# Patient Record
Sex: MALE | Race: WHITE | NOT HISPANIC OR LATINO | Employment: OTHER | URBAN - METROPOLITAN AREA
[De-identification: names, ages, dates, MRNs, and addresses within clinical notes are randomized per-mention and may not be internally consistent; named-entity substitution may affect disease eponyms.]

---

## 2017-01-26 RX ORDER — LISINOPRIL 2.5 MG/1
TABLET ORAL
Qty: 90 TABLET | Refills: 1 | Status: SHIPPED | OUTPATIENT
Start: 2017-01-26 | End: 2017-07-27 | Stop reason: SDUPTHER

## 2017-03-23 ENCOUNTER — TELEPHONE (OUTPATIENT)
Dept: INTERNAL MEDICINE | Age: 72
End: 2017-03-23

## 2017-03-23 ENCOUNTER — OFFICE VISIT (OUTPATIENT)
Dept: INTERNAL MEDICINE | Age: 72
End: 2017-03-23

## 2017-03-23 VITALS
HEIGHT: 70 IN | BODY MASS INDEX: 30.85 KG/M2 | WEIGHT: 215.5 LBS | DIASTOLIC BLOOD PRESSURE: 62 MMHG | SYSTOLIC BLOOD PRESSURE: 140 MMHG | OXYGEN SATURATION: 100 % | HEART RATE: 72 BPM | TEMPERATURE: 97.6 F

## 2017-03-23 DIAGNOSIS — Z00.00 MEDICARE ANNUAL WELLNESS VISIT, SUBSEQUENT: Primary | ICD-10-CM

## 2017-03-23 DIAGNOSIS — K21.9 GASTROESOPHAGEAL REFLUX DISEASE WITHOUT ESOPHAGITIS: ICD-10-CM

## 2017-03-23 DIAGNOSIS — I10 ESSENTIAL HYPERTENSION: ICD-10-CM

## 2017-03-23 DIAGNOSIS — E78.49 OTHER HYPERLIPIDEMIA: ICD-10-CM

## 2017-03-23 DIAGNOSIS — G25.81 RESTLESS LEGS SYNDROME: ICD-10-CM

## 2017-03-23 LAB
ALBUMIN SERPL-MCNC: 4.7 G/DL (ref 3.5–5.2)
ALBUMIN/GLOB SERPL: 2 G/DL
ALP SERPL-CCNC: 79 U/L (ref 39–117)
ALT SERPL-CCNC: 38 U/L (ref 1–41)
APPEARANCE UR: CLEAR
AST SERPL-CCNC: 26 U/L (ref 1–40)
BACTERIA #/AREA URNS HPF: ABNORMAL /HPF
BASOPHILS # BLD AUTO: 0.04 10*3/MM3 (ref 0–0.2)
BASOPHILS NFR BLD AUTO: 0.8 % (ref 0–1.5)
BILIRUB SERPL-MCNC: 2.1 MG/DL (ref 0.1–1.2)
BILIRUB UR QL STRIP: NEGATIVE
BUN SERPL-MCNC: 15 MG/DL (ref 8–23)
BUN/CREAT SERPL: 17.6 (ref 7–25)
CALCIUM SERPL-MCNC: 9.8 MG/DL (ref 8.6–10.5)
CHLORIDE SERPL-SCNC: 99 MMOL/L (ref 98–107)
CHOLEST SERPL-MCNC: 171 MG/DL (ref 0–200)
CO2 SERPL-SCNC: 24.8 MMOL/L (ref 22–29)
COLOR UR: ABNORMAL
CREAT SERPL-MCNC: 0.85 MG/DL (ref 0.76–1.27)
DEVELOPER EXPIRATION DATE: NORMAL
DEVELOPER LOT NUMBER: NORMAL
EOSINOPHIL # BLD AUTO: 0.09 10*3/MM3 (ref 0–0.7)
EOSINOPHIL NFR BLD AUTO: 1.8 % (ref 0.3–6.2)
EPI CELLS #/AREA URNS HPF: ABNORMAL /HPF
ERYTHROCYTE [DISTWIDTH] IN BLOOD BY AUTOMATED COUNT: 15.3 % (ref 11.5–14.5)
EXPIRATION DATE: NORMAL
FECAL OCCULT BLOOD SCREEN, POC: NEGATIVE
GLOBULIN SER CALC-MCNC: 2.4 GM/DL
GLUCOSE SERPL-MCNC: 126 MG/DL (ref 65–99)
GLUCOSE UR QL: NEGATIVE
HCT VFR BLD AUTO: 37 % (ref 40.4–52.2)
HDLC SERPL-MCNC: 68 MG/DL (ref 40–60)
HGB BLD-MCNC: 11.9 G/DL (ref 13.7–17.6)
HGB UR QL STRIP: NEGATIVE
IMM GRANULOCYTES # BLD: 0.02 10*3/MM3 (ref 0–0.03)
IMM GRANULOCYTES NFR BLD: 0.4 % (ref 0–0.5)
KETONES UR QL STRIP: NEGATIVE
LDLC SERPL CALC-MCNC: 82 MG/DL (ref 0–100)
LEUKOCYTE ESTERASE UR QL STRIP: ABNORMAL
LYMPHOCYTES # BLD AUTO: 0.97 10*3/MM3 (ref 0.9–4.8)
LYMPHOCYTES NFR BLD AUTO: 19.9 % (ref 19.6–45.3)
Lab: NORMAL
MCH RBC QN AUTO: 30.8 PG (ref 27–32.7)
MCHC RBC AUTO-ENTMCNC: 32.2 G/DL (ref 32.6–36.4)
MCV RBC AUTO: 95.9 FL (ref 79.8–96.2)
MONOCYTES # BLD AUTO: 0.53 10*3/MM3 (ref 0.2–1.2)
MONOCYTES NFR BLD AUTO: 10.9 % (ref 5–12)
NEGATIVE CONTROL: NEGATIVE
NEUTROPHILS # BLD AUTO: 3.23 10*3/MM3 (ref 1.9–8.1)
NEUTROPHILS NFR BLD AUTO: 66.2 % (ref 42.7–76)
NITRITE UR QL STRIP: NEGATIVE
PH UR STRIP: 6.5 [PH] (ref 5–8)
PLATELET # BLD AUTO: 272 10*3/MM3 (ref 140–500)
POSITIVE CONTROL: POSITIVE
POTASSIUM SERPL-SCNC: 4.5 MMOL/L (ref 3.5–5.2)
PROT SERPL-MCNC: 7.1 G/DL (ref 6–8.5)
PROT UR QL STRIP: NEGATIVE
PSA SERPL-MCNC: 1.45 NG/ML (ref 0–4)
RBC # BLD AUTO: 3.86 10*6/MM3 (ref 4.6–6)
RBC #/AREA URNS HPF: ABNORMAL /HPF
SODIUM SERPL-SCNC: 138 MMOL/L (ref 136–145)
SP GR UR: 1.02 (ref 1–1.03)
TRIGL SERPL-MCNC: 105 MG/DL (ref 0–150)
UROBILINOGEN UR STRIP-MCNC: ABNORMAL MG/DL
VLDLC SERPL CALC-MCNC: 21 MG/DL (ref 5–40)
WBC # BLD AUTO: 4.88 10*3/MM3 (ref 4.5–10.7)
WBC #/AREA URNS HPF: ABNORMAL /HPF

## 2017-03-23 PROCEDURE — G0439 PPPS, SUBSEQ VISIT: HCPCS | Performed by: INTERNAL MEDICINE

## 2017-03-23 PROCEDURE — G0328 FECAL BLOOD SCRN IMMUNOASSAY: HCPCS | Performed by: INTERNAL MEDICINE

## 2017-03-23 PROCEDURE — 99214 OFFICE O/P EST MOD 30 MIN: CPT | Performed by: INTERNAL MEDICINE

## 2017-03-23 NOTE — PROGRESS NOTES
QUICK REFERENCE INFORMATION:  The ABCs of the Annual Wellness Visit    Subsequent Medicare Wellness Visit    HEALTH RISK ASSESSMENT    1945    Recent Hospitalizations:  No recent hospitalization(s)..        Current Medical Providers:  Patient Care Team:  Rosalio Lopes MD as PCP - General        Smoking Status:  History   Smoking Status   • Former Smoker   • Years: 48.00   • Types: Cigarettes   • Quit date: 2004   Smokeless Tobacco   • Not on file     Comment: He is agreeable to have low dose CT scan done.  Completed June 2016-negative       Alcohol Consumption:  History   Alcohol Use   • Yes     Comment: social drinker       Depression Screen:   PHQ-9 Depression Screening 3/23/2017   Little interest or pleasure in doing things 0   Feeling down, depressed, or hopeless 0   Trouble falling or staying asleep, or sleeping too much 3   Feeling tired or having little energy 2   Poor appetite or overeating 0   Feeling bad about yourself - or that you are a failure or have let yourself or your family down 0   Trouble concentrating on things, such as reading the newspaper or watching television 0   Moving or speaking so slowly that other people could have noticed. Or the opposite - being so fidgety or restless that you have been moving around a lot more than usual 0   Thoughts that you would be better off dead, or of hurting yourself in some way 0   PHQ-9 Total Score 5   If you checked off any problems, how difficult have these problems made it for you to do your work, take care of things at home, or get along with other people? Not difficult at all       Health Habits and Functional and Cognitive Screening:  Functional & Cognitive Status 3/23/2017   Do you have difficulty preparing food and eating? No   Do you have difficulty bathing yourself? No   Do you have difficulty getting dressed? No   Do you have difficulty using the toilet? No   Do you have difficulty moving around from place to place? No   In the past year  have you fallen or experienced a near fall? No   Do you need help using the phone?  No   Are you deaf or do you have serious difficulty hearing?  Yes   Do you need help with transportation? No   Do you need help shopping? No   Do you need help preparing meals?  No   Do you need help with housework?  No   Do you need help with laundry? No   Do you need help taking your medications? No   Do you need help managing money? No             Does the patient have evidence of cognitive impairment? No    Asprin use counseling:Patient is currently on aspirin      Recent Lab Results:  CMP:  Lab Results   Component Value Date     (H) 10/27/2016    BUN 15 10/27/2016    CREATININE 0.91 10/27/2016    EGFRIFNONA 82 10/27/2016    EGFRIFAFRI 99 10/27/2016    BCR 16.5 10/27/2016     10/27/2016    K 4.8 10/27/2016    CO2 27.0 10/27/2016    CALCIUM 9.4 10/27/2016    PROTENTOTREF 6.9 10/27/2016    ALBUMIN 4.80 10/27/2016    LABGLOBREF 2.1 10/27/2016    LABIL2 2.3 10/27/2016    BILITOT 1.7 (H) 10/27/2016    ALKPHOS 74 10/27/2016    AST 24 10/27/2016    ALT 34 10/27/2016     Lipid Panel:  Lab Results   Component Value Date    CHLPL 162 10/27/2016    TRIG 101 10/27/2016    HDL 63 (H) 10/27/2016    VLDL 20.2 10/27/2016    LDL 79 10/27/2016     LDL:     HbA1c:     Urine Microalbumin:     Visual Acuity:  No exam data present    Age-appropriate Screening Schedule:  Refer to the list below for future screening recommendations based on patient's age, sex and/or medical conditions. Orders for these recommended tests are listed in the plan section. The patient has been provided with a written plan.    Health Maintenance   Topic Date Due   • LIPID PANEL  10/27/2017   • TDAP/TD VACCINES (2 - Td) 06/27/2026   • COLONOSCOPY  09/22/2026   • INFLUENZA VACCINE  Completed   • PNEUMOCOCCAL VACCINES (65+ LOW/MEDIUM RISK)  Completed   • ZOSTER VACCINE  Completed        Subjective   History of Present Illness    Alejandro Allison is a 72 y.o. male  who presents for an Subsequent Wellness Visit.    The following portions of the patient's history were reviewed and updated as appropriate: allergies, current medications, past family history, past medical history, past social history, past surgical history and problem list.    Outpatient Medications Prior to Visit   Medication Sig Dispense Refill   • albuterol (PROVENTIL HFA;VENTOLIN HFA) 108 (90 BASE) MCG/ACT inhaler Inhale 2 puffs 4 (four) times a day. 1 inhaler 3   • aspirin 81 MG EC tablet Take by mouth.     • atorvastatin (LIPITOR) 20 MG tablet TAKE ONE TABLET BY MOUTH DAILY AT BEDTIME 90 tablet 1   • calcium carbonate-vitamin d 600-400 MG-UNIT per tablet Take 1 tablet by mouth 2 (two) times a day.     • lisinopril (PRINIVIL,ZESTRIL) 2.5 MG tablet TAKE ONE TABLET BY MOUTH EVERY DAY 90 tablet 1   • omeprazole (PRILOSEC) 20 MG capsule Take 1 capsule by mouth daily. 90 capsule 1   • rOPINIRole (REQUIP) 0.25 MG tablet Take 1 tablet by mouth Every Night. Take 1 hour before bedtime. 30 tablet 3   • tadalafil (CIALIS) 20 MG tablet Take 1 tablet by mouth. 1 hour before activity as needed       No facility-administered medications prior to visit.        Patient Active Problem List   Diagnosis   • Anemia   • Erectile dysfunction of nonorganic origin   • Increased glucose level   • Gastroesophageal reflux disease   • Hyperlipidemia   • Hypertension   • Overweight   • Restless legs syndrome   • Encounter for immunization   • Former smoker   • Routine health maintenance       Advanced Care Planning:  power of  for healthcare on file    Identification of Risk Factors:  Risk factors include: weight  and inactivity.    Review of Systems    Compared to one year ago, the patient feels his physical health is the same.  Compared to one year ago, the patient feels his mental health is the same.    Objective     Physical Exam    Vitals:    03/23/17 0957   BP: 160/80   Pulse: 72   Temp: 97.6 °F (36.4 °C)   SpO2: 100%  "  Weight: 215 lb 8 oz (97.8 kg)   Height: 70\" (177.8 cm)   PainSc: 0-No pain       Body mass index is 30.92 kg/(m^2).  Discussed the patient's BMI with him. The BMI is above average; no BMI management plan is appropriate..    Assessment/Plan   Patient Self-Management and Personalized Health Advice  The patient has been provided with information about: exercise and weight management and preventive services including:   · Diabetes screening, see lab orders.    Visit Diagnoses:    ICD-10-CM ICD-9-CM   1. Medicare annual wellness visit, subsequent Z00.00 V70.0   2. Essential hypertension I10 401.9   3. Other hyperlipidemia E78.4 272.4   4. Gastroesophageal reflux disease without esophagitis K21.9 530.81   5. Restless legs syndrome G25.81 333.94       No orders of the defined types were placed in this encounter.      Outpatient Encounter Prescriptions as of 3/23/2017   Medication Sig Dispense Refill   • albuterol (PROVENTIL HFA;VENTOLIN HFA) 108 (90 BASE) MCG/ACT inhaler Inhale 2 puffs 4 (four) times a day. 1 inhaler 3   • aspirin 81 MG EC tablet Take by mouth.     • atorvastatin (LIPITOR) 20 MG tablet TAKE ONE TABLET BY MOUTH DAILY AT BEDTIME 90 tablet 1   • calcium carbonate-vitamin d 600-400 MG-UNIT per tablet Take 1 tablet by mouth 2 (two) times a day.     • lisinopril (PRINIVIL,ZESTRIL) 2.5 MG tablet TAKE ONE TABLET BY MOUTH EVERY DAY 90 tablet 1   • omeprazole (PRILOSEC) 20 MG capsule Take 1 capsule by mouth daily. 90 capsule 1   • rOPINIRole (REQUIP) 0.25 MG tablet Take 1 tablet by mouth Every Night. Take 1 hour before bedtime. 30 tablet 3   • [DISCONTINUED] tadalafil (CIALIS) 20 MG tablet Take 1 tablet by mouth. 1 hour before activity as needed       No facility-administered encounter medications on file as of 3/23/2017.        Reviewed use of high risk medication in the elderly: yes  Reviewed for potential of harmful drug interactions in the elderly: yes    Follow Up:  1 year    An After Visit Summary and PPPS " with all of these plans were given to the patient.

## 2017-03-23 NOTE — PROGRESS NOTES
Subjective   Alejandro Allison is a 72 y.o. male.     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  There are no medication side effects noted.    Hyperlipidemia: He is compliant with medication and diet, he is fasting for lab work today.    Reflux, patient's compliant with omeprazole this control symptoms to his satisfaction..    Restless leg syndrome, well controlled with use of ropinirole, patient does not even need to take this on an every night basis.  No medication side effects noted.        The following portions of the patient's history were reviewed and updated as appropriate: allergies, current medications, past family history, past medical history, past social history, past surgical history and problem list.    Review of Systems   Constitutional: Negative.    HENT: Positive for hearing loss.         No impairment in activities of daily living noted   Eyes: Negative.    Respiratory: Negative.    Cardiovascular: Negative.    Gastrointestinal: Negative.    Endocrine: Negative.    Genitourinary: Negative.    Musculoskeletal: Negative.    Skin: Negative.    Allergic/Immunologic: Negative for immunocompromised state.   Neurological: Negative.    Hematological: Negative.    Psychiatric/Behavioral: Negative.        Objective   Physical Exam   Constitutional: He is oriented to person, place, and time. He appears well-developed and well-nourished. No distress.   HENT:   Head: Normocephalic and atraumatic.   Right Ear: Tympanic membrane, external ear and ear canal normal.   Left Ear: Tympanic membrane, external ear and ear canal normal.   Mouth/Throat: Uvula is midline, oropharynx is clear and moist and mucous membranes are normal.   Eyes: Conjunctivae and EOM are normal. Pupils are equal, round, and reactive to light.   Neck: Normal range of motion. Neck supple. No JVD present. Carotid bruit is  not present. No thyromegaly present.   Cardiovascular: Normal rate, regular rhythm, normal heart sounds and intact distal pulses.  Exam reveals no gallop and no friction rub.    No murmur heard.  Pulmonary/Chest: Effort normal and breath sounds normal. He has no wheezes. He has no rales.   Abdominal: Soft. Bowel sounds are normal. There is no hepatosplenomegaly. There is no tenderness. Hernia confirmed negative in the right inguinal area and confirmed negative in the left inguinal area.   Genitourinary: Rectum normal, testes normal and penis normal. Prostate is enlarged.   Genitourinary Comments: No nodules appreciated in the prostate   Musculoskeletal: Normal range of motion. He exhibits no edema or deformity.   Lymphadenopathy:     He has no cervical adenopathy.   Neurological: He is alert and oriented to person, place, and time. He has normal strength and normal reflexes. No cranial nerve deficit or sensory deficit. Coordination normal.   Skin: Skin is warm and dry. No rash noted.   Psychiatric: He has a normal mood and affect. His speech is normal and behavior is normal. Judgment and thought content normal. Cognition and memory are normal.   Nursing note and vitals reviewed.      Assessment/Plan   Alejandro was seen today for annual exam.    Diagnoses and all orders for this visit:    Medicare annual wellness visit, subsequent  -     CBC & Differential  -     POC Occult Blood Stool  -     PSA    Essential hypertension  -     Comprehensive Metabolic Panel  -     Lipid Panel  -     Urinalysis With / Microscopic If Indicated    Other hyperlipidemia  -     Lipid Panel    Gastroesophageal reflux disease without esophagitis    Restless legs syndrome      #1 subsequent annual Medicare wellness evaluation, clinically stable, lab as above.  Follow-up results by mail.  Recommend repeat exam one year.    Hypertension stable on current medical regimen.  Plan: Same meds, lab as above follow-up results as above recommend blood  pressure check 4 months    #3 hyperlipidemia on medication, status to be determined.  Plan: Check lipids today follow-up results by mail adjust dosage if indicated.    #4 reflux stable on current medication.  Plan: Continue same.    #5 restless legs controlled with as needed use of Requip.  Plan: Continue same.

## 2017-05-12 DIAGNOSIS — K21.9 GASTROESOPHAGEAL REFLUX DISEASE WITHOUT ESOPHAGITIS: Primary | ICD-10-CM

## 2017-05-12 RX ORDER — OMEPRAZOLE 20 MG/1
CAPSULE, DELAYED RELEASE ORAL
Qty: 90 CAPSULE | Refills: 0 | Status: SHIPPED | OUTPATIENT
Start: 2017-05-12 | End: 2017-08-17 | Stop reason: SDUPTHER

## 2017-06-05 RX ORDER — ATORVASTATIN CALCIUM 20 MG/1
TABLET, FILM COATED ORAL
Qty: 90 TABLET | Refills: 0 | Status: SHIPPED | OUTPATIENT
Start: 2017-06-05 | End: 2017-09-09 | Stop reason: SDUPTHER

## 2017-07-03 DIAGNOSIS — R06.02 SHORTNESS OF BREATH: ICD-10-CM

## 2017-07-03 DIAGNOSIS — Z87.891 FORMER SMOKER: ICD-10-CM

## 2017-07-24 ENCOUNTER — OFFICE VISIT (OUTPATIENT)
Dept: INTERNAL MEDICINE | Age: 72
End: 2017-07-24

## 2017-07-24 VITALS
HEART RATE: 67 BPM | WEIGHT: 213.6 LBS | SYSTOLIC BLOOD PRESSURE: 138 MMHG | HEIGHT: 70 IN | OXYGEN SATURATION: 98 % | TEMPERATURE: 97.5 F | DIASTOLIC BLOOD PRESSURE: 60 MMHG | BODY MASS INDEX: 30.58 KG/M2

## 2017-07-24 DIAGNOSIS — I10 ESSENTIAL HYPERTENSION: Primary | ICD-10-CM

## 2017-07-24 DIAGNOSIS — R73.09 INCREASED GLUCOSE LEVEL: ICD-10-CM

## 2017-07-24 DIAGNOSIS — R25.2 CRAMP OF BOTH LOWER EXTREMITIES: ICD-10-CM

## 2017-07-24 PROCEDURE — 99214 OFFICE O/P EST MOD 30 MIN: CPT | Performed by: INTERNAL MEDICINE

## 2017-07-25 LAB
ALBUMIN SERPL-MCNC: 4.7 G/DL (ref 3.5–5.2)
ALBUMIN/GLOB SERPL: 2.1 G/DL
ALP SERPL-CCNC: 70 U/L (ref 39–117)
ALT SERPL-CCNC: 43 U/L (ref 1–41)
AST SERPL-CCNC: 30 U/L (ref 1–40)
BILIRUB SERPL-MCNC: 1.9 MG/DL (ref 0.1–1.2)
BUN SERPL-MCNC: 16 MG/DL (ref 8–23)
BUN/CREAT SERPL: 16.5 (ref 7–25)
CALCIUM SERPL-MCNC: 9.7 MG/DL (ref 8.6–10.5)
CHLORIDE SERPL-SCNC: 99 MMOL/L (ref 98–107)
CO2 SERPL-SCNC: 26.8 MMOL/L (ref 22–29)
CREAT SERPL-MCNC: 0.97 MG/DL (ref 0.76–1.27)
GLOBULIN SER CALC-MCNC: 2.2 GM/DL
GLUCOSE SERPL-MCNC: 121 MG/DL (ref 65–99)
MAGNESIUM SERPL-MCNC: 1.7 MG/DL (ref 1.6–2.4)
POTASSIUM SERPL-SCNC: 4.6 MMOL/L (ref 3.5–5.2)
PROT SERPL-MCNC: 6.9 G/DL (ref 6–8.5)
SODIUM SERPL-SCNC: 139 MMOL/L (ref 136–145)
TSH SERPL DL<=0.005 MIU/L-ACNC: 3.05 MIU/ML (ref 0.27–4.2)

## 2017-07-27 RX ORDER — LISINOPRIL 2.5 MG/1
TABLET ORAL
Qty: 90 TABLET | Refills: 1 | Status: SHIPPED | OUTPATIENT
Start: 2017-07-27 | End: 2018-02-01 | Stop reason: SDUPTHER

## 2017-08-07 DIAGNOSIS — R06.02 SHORTNESS OF BREATH: ICD-10-CM

## 2017-08-07 DIAGNOSIS — Z87.891 FORMER SMOKER: ICD-10-CM

## 2017-08-07 RX ORDER — ALBUTEROL SULFATE 90 UG/1
AEROSOL, METERED RESPIRATORY (INHALATION)
Qty: 18 G | Refills: 0 | Status: SHIPPED | OUTPATIENT
Start: 2017-08-07 | End: 2017-10-26 | Stop reason: SDUPTHER

## 2017-08-17 DIAGNOSIS — K21.9 GASTROESOPHAGEAL REFLUX DISEASE WITHOUT ESOPHAGITIS: ICD-10-CM

## 2017-08-17 RX ORDER — OMEPRAZOLE 20 MG/1
CAPSULE, DELAYED RELEASE ORAL
Qty: 90 CAPSULE | Refills: 0 | Status: SHIPPED | OUTPATIENT
Start: 2017-08-17 | End: 2017-11-16 | Stop reason: SDUPTHER

## 2017-09-11 RX ORDER — ATORVASTATIN CALCIUM 20 MG/1
TABLET, FILM COATED ORAL
Qty: 90 TABLET | Refills: 0 | Status: SHIPPED | OUTPATIENT
Start: 2017-09-11 | End: 2017-12-07 | Stop reason: SDUPTHER

## 2017-10-26 DIAGNOSIS — Z87.891 FORMER SMOKER: ICD-10-CM

## 2017-10-26 DIAGNOSIS — R06.02 SHORTNESS OF BREATH: ICD-10-CM

## 2017-10-26 RX ORDER — ALBUTEROL SULFATE 90 UG/1
AEROSOL, METERED RESPIRATORY (INHALATION)
Qty: 18 G | Refills: 0 | Status: SHIPPED | OUTPATIENT
Start: 2017-10-26 | End: 2018-04-19 | Stop reason: DRUGHIGH

## 2017-11-14 ENCOUNTER — OFFICE VISIT (OUTPATIENT)
Dept: INTERNAL MEDICINE | Age: 72
End: 2017-11-14

## 2017-11-14 VITALS
TEMPERATURE: 98.4 F | HEIGHT: 70 IN | WEIGHT: 220.4 LBS | HEART RATE: 76 BPM | BODY MASS INDEX: 31.55 KG/M2 | DIASTOLIC BLOOD PRESSURE: 72 MMHG | OXYGEN SATURATION: 99 % | SYSTOLIC BLOOD PRESSURE: 138 MMHG

## 2017-11-14 DIAGNOSIS — Z23 NEEDS FLU SHOT: ICD-10-CM

## 2017-11-14 DIAGNOSIS — I10 ESSENTIAL HYPERTENSION: Primary | ICD-10-CM

## 2017-11-14 DIAGNOSIS — Z87.891 FORMER SMOKER: ICD-10-CM

## 2017-11-14 DIAGNOSIS — E66.3 OVERWEIGHT: ICD-10-CM

## 2017-11-14 PROCEDURE — 99214 OFFICE O/P EST MOD 30 MIN: CPT | Performed by: INTERNAL MEDICINE

## 2017-11-14 PROCEDURE — 90662 IIV NO PRSV INCREASED AG IM: CPT | Performed by: INTERNAL MEDICINE

## 2017-11-14 PROCEDURE — G0008 ADMIN INFLUENZA VIRUS VAC: HCPCS | Performed by: INTERNAL MEDICINE

## 2017-11-14 NOTE — PROGRESS NOTES
"    Alejandro Allison / 72 y.o. / male  11/14/2017  VITALS    /72  Pulse 76  Temp 98.4 °F (36.9 °C)  Ht 70\" (177.8 cm)  Wt 220 lb 6.4 oz (100 kg)  SpO2 99%  BMI 31.62 kg/m2    BP Readings from Last 3 Encounters:   11/14/17 138/72   07/24/17 138/60   03/23/17 140/62     Wt Readings from Last 3 Encounters:   11/14/17 220 lb 6.4 oz (100 kg)   07/24/17 213 lb 9.6 oz (96.9 kg)   03/23/17 215 lb 8 oz (97.8 kg)      Body mass index is 31.62 kg/(m^2).    CC:  Main reason(s) for today's visit: Hypertension (4 month f/u HTN, BP check)      HPI:     Patient presents today for follow-up.  When last seen by me, blood pressure is well-controlled at 138/60.    Hypertension is compliant with medication, dietary salt restriction, but not so much with dieting or exercise.  Patient has gained 5 pounds since earlier this year he has approximately 40 pounds above his ideal body weight at this time.    Weight control: See above, we did discuss the importance of weight control to improve blood pressure control.    Former smoker, last low-dose CT scan done June 2016 which was normal.  Patient has not smoked now in the past 13 years.    Laboratory July 2017, CMP except for bilirubin 1.9.    Patient Care Team:  Rosalio Lopes MD as PCP - General  ____________________________________________________________________    ASSESSMENT & PLAN:    Problem List Items Addressed This Visit        Unprioritized    Hypertension - Primary    Relevant Medications    lisinopril (PRINIVIL,ZESTRIL) 2.5 MG tablet    Overweight    Former smoker    Relevant Medications    VENTOLIN  (90 Base) MCG/ACT inhaler    Other Relevant Orders    CT Chest Low Dose Wo      Other Visit Diagnoses     Needs flu shot        Relevant Orders    Flu Vaccine High Dose PF 65YR+ ([id number]) (Completed)        Orders Placed This Encounter   Procedures   • CT Chest Low Dose Wo   • Flu Vaccine High Dose PF 65YR+ ([id number])       Summary/Discussion:  · Number-one " hypertension stable on current medical regimen.  Plan: Same meds, blood pressure check 4 months.  ·   · #2 weight control, patient urged to lose weight, increase exercise, watch his portion size in effort to reduce some of the 40 pounds that he is over his ideal body weight.  ·   · #3 former smoker, type repeat low-dose CT scan.  ·   · #4 immunization update, flu shot high-dose.    Return in about 4 months (around 3/14/2018) for Blood pressure check.    No future appointments.    ______________________________________________________    REVIEW OF SYSTEMS    Review of Systems   Respiratory: Negative for chest tightness and shortness of breath.    Cardiovascular: Negative for chest pain and palpitations.   Neurological: Negative for dizziness, syncope, speech difficulty, weakness, light-headedness and headaches.       PHYSICAL EXAMINATION    Physical Exam   Constitutional: He is oriented to person, place, and time. He appears well-developed. No distress.   obese   Cardiovascular: Normal rate, regular rhythm, normal heart sounds and intact distal pulses.  Exam reveals no gallop and no friction rub.    No murmur heard.  Pulmonary/Chest: Effort normal and breath sounds normal. He has no wheezes. He has no rales.   Musculoskeletal: He exhibits no edema.   Neurological: He is alert and oriented to person, place, and time.   Skin: Skin is warm and dry. No rash noted.   Psychiatric: He has a normal mood and affect. His behavior is normal. Judgment and thought content normal.   Nursing note and vitals reviewed.      REVIEWED DATA:    Labs:     Lab Results   Component Value Date     07/24/2017    K 4.6 07/24/2017    AST 30 07/24/2017    ALT 43 (H) 07/24/2017    BUN 16 07/24/2017    CREATININE 0.97 07/24/2017    CREATININE 0.85 03/23/2017    CREATININE 0.91 10/27/2016    EGFRIFNONA 76 07/24/2017    EGFRIFAFRI 92 07/24/2017       Lab Results   Component Value Date     (H) 07/24/2017     (H) 03/23/2017    GLU  127 (H) 10/27/2016       Lab Results   Component Value Date    LDL 82 03/23/2017    LDL 79 10/27/2016    LDL 80 08/12/2015    LDL 80 08/12/2015    HDL 68 (H) 03/23/2017    TRIG 105 03/23/2017       Lab Results   Component Value Date    TSH 3.050 07/24/2017       Lab Results   Component Value Date    WBC 4.88 03/23/2017    HGB 11.9 (L) 03/23/2017    HGB 12.5 (L) 09/16/2015    HGB 12.8 (L) 08/12/2015    HGB 12.8 (L) 08/12/2015     03/23/2017       Imaging:         Medical Tests:         Summary of old records / correspondence / consultant report:         Request outside records:         ALLERGIES  Allergies   Allergen Reactions   • Pravastatin    • Zocor  [Simvastatin]         MEDICATIONS  Current Outpatient Prescriptions   Medication Sig Dispense Refill   • aspirin 81 MG EC tablet Take by mouth.     • atorvastatin (LIPITOR) 20 MG tablet TAKE ONE TABLET BY MOUTH DAILY AT BEDTIME 90 tablet 0   • calcium carbonate-vitamin d 600-400 MG-UNIT per tablet Take 1 tablet by mouth 2 (two) times a day.     • lisinopril (PRINIVIL,ZESTRIL) 2.5 MG tablet TAKE ONE TABLET BY MOUTH ONCE DAILY 90 tablet 1   • omeprazole (priLOSEC) 20 MG capsule TAKE ONE CAPSULE BY MOUTH ONCE DAILY 90 capsule 0   • rOPINIRole (REQUIP) 0.25 MG tablet Take 1 tablet by mouth Every Night. Take 1 hour before bedtime. 30 tablet 3   • VENTOLIN  (90 Base) MCG/ACT inhaler INHALE 2 PUFFS INTO LUNGS FOUR TIMES A DAY AS DIRECTED USING SPACER 18 g 0     No current facility-administered medications for this visit.        PFSH:     The following portions of the patient's history were reviewed and updated as appropriate: Allergies / Current Medications / Past Medical History / Surgical History / Social History / Family History    PROBLEM LIST   Patient Active Problem List   Diagnosis   • Anemia   • Erectile dysfunction of nonorganic origin   • Increased glucose level   • Gastroesophageal reflux disease   • Hyperlipidemia   • Hypertension   • Overweight    • Restless legs syndrome   • Encounter for immunization   • Former smoker   • Routine health maintenance       PAST MEDICAL HISTORY  Past Medical History:   Diagnosis Date   • Abnormal blood chemistry    • Anemia    • Diverticulosis 02/2014    Noted on colonoscopy February 2014   • Gilbert's disease    • History of deep vein thrombophlebitis of lower extremity    • Hyperlipidemia    • Hypertension    • Insomnia    • Overweight    • Preoperative evaluation to rule out surgical contraindication    • Skin ulcer    • Stroke        SURGICAL HISTORY  Past Surgical History:   Procedure Laterality Date   • ANKLE SURGERY Right    • COLONOSCOPY      Complete   • COLONOSCOPY N/A 9/22/2016    Procedure: COLONOSCOPY INTO CECUM;  Surgeon: Eze Oden MD;  Location: [facility] ENDOSCOPY;  Service:    • FRACTURE SURGERY     • ROTATOR CUFF REPAIR         SOCIAL HISTORY  Social History     Social History   • Marital status: Single     Spouse name: N/A   • Number of children: 2   • Years of education: N/A     Social History Main Topics   • Smoking status: Former Smoker     Years: 48.00     Types: Cigarettes     Quit date: 2004   • Smokeless tobacco: Never Used      Comment: He is agreeable to have low dose CT scan done.  Completed June 2016-negative   • Alcohol use Yes      Comment: social drinker   • Drug use: None   • Sexual activity: Not Asked     Other Topics Concern   • None     Social History Narrative   • None       FAMILY HISTORY  Family History   Problem Relation Age of Onset   • Hypertension Mother    • Colon cancer Father    • Hypertension Brother    • Diabetes Other      mellitus   • Lung cancer Other          **Dragon Disclaimer:   Much of this encounter note is an electronic transcription/translation of spoken language to printed text. The electronic translation of spoken language may permit erroneous, or at times, nonsensical words or phrases to be inadvertently transcribed. Although I have reviewed the note for  such errors, some may still exist.

## 2017-11-16 DIAGNOSIS — K21.9 GASTROESOPHAGEAL REFLUX DISEASE WITHOUT ESOPHAGITIS: ICD-10-CM

## 2017-11-16 DIAGNOSIS — G25.81 RESTLESS LEGS SYNDROME: Primary | ICD-10-CM

## 2017-11-16 RX ORDER — OMEPRAZOLE 20 MG/1
CAPSULE, DELAYED RELEASE ORAL
Qty: 90 CAPSULE | Refills: 1 | Status: SHIPPED | OUTPATIENT
Start: 2017-11-16 | End: 2018-05-10 | Stop reason: SDUPTHER

## 2017-11-16 RX ORDER — ROPINIROLE 0.25 MG/1
TABLET, FILM COATED ORAL
Qty: 30 TABLET | Refills: 5 | Status: SHIPPED | OUTPATIENT
Start: 2017-11-16 | End: 2018-01-11 | Stop reason: SDUPTHER

## 2017-12-07 RX ORDER — ATORVASTATIN CALCIUM 20 MG/1
TABLET, FILM COATED ORAL
Qty: 90 TABLET | Refills: 0 | Status: SHIPPED | OUTPATIENT
Start: 2017-12-07 | End: 2018-03-01 | Stop reason: SDUPTHER

## 2018-01-11 DIAGNOSIS — G25.81 RESTLESS LEGS SYNDROME: ICD-10-CM

## 2018-01-11 RX ORDER — ROPINIROLE 0.25 MG/1
TABLET, FILM COATED ORAL
Qty: 90 TABLET | Refills: 0 | Status: SHIPPED | OUTPATIENT
Start: 2018-01-11

## 2018-02-01 RX ORDER — LISINOPRIL 2.5 MG/1
TABLET ORAL
Qty: 90 TABLET | Refills: 1 | Status: SHIPPED | OUTPATIENT
Start: 2018-02-01 | End: 2018-07-31 | Stop reason: SDUPTHER

## 2018-03-01 DIAGNOSIS — R06.02 SHORTNESS OF BREATH: ICD-10-CM

## 2018-03-01 DIAGNOSIS — Z87.891 FORMER SMOKER: ICD-10-CM

## 2018-03-01 RX ORDER — ALBUTEROL SULFATE 90 UG/1
AEROSOL, METERED RESPIRATORY (INHALATION)
Qty: 18 G | Refills: 0 | Status: SHIPPED | OUTPATIENT
Start: 2018-03-01 | End: 2018-07-31 | Stop reason: SDUPTHER

## 2018-03-01 RX ORDER — ATORVASTATIN CALCIUM 20 MG/1
TABLET, FILM COATED ORAL
Qty: 90 TABLET | Refills: 0 | Status: SHIPPED | OUTPATIENT
Start: 2018-03-01 | End: 2018-04-19 | Stop reason: DRUGHIGH

## 2018-04-19 ENCOUNTER — OFFICE VISIT (OUTPATIENT)
Dept: INTERNAL MEDICINE | Age: 73
End: 2018-04-19

## 2018-04-19 VITALS
BODY MASS INDEX: 31.41 KG/M2 | TEMPERATURE: 97.9 F | HEART RATE: 66 BPM | DIASTOLIC BLOOD PRESSURE: 64 MMHG | SYSTOLIC BLOOD PRESSURE: 128 MMHG | HEIGHT: 70 IN | WEIGHT: 219.4 LBS | OXYGEN SATURATION: 97 %

## 2018-04-19 DIAGNOSIS — I10 ESSENTIAL HYPERTENSION: Primary | ICD-10-CM

## 2018-04-19 DIAGNOSIS — Z00.00 ROUTINE HEALTH MAINTENANCE: ICD-10-CM

## 2018-04-19 DIAGNOSIS — E78.49 OTHER HYPERLIPIDEMIA: ICD-10-CM

## 2018-04-19 DIAGNOSIS — Z87.891 FORMER SMOKER: ICD-10-CM

## 2018-04-19 DIAGNOSIS — G25.81 RESTLESS LEGS SYNDROME: ICD-10-CM

## 2018-04-19 LAB
ALT SERPL-CCNC: 40 U/L (ref 1–41)
AST SERPL-CCNC: 29 U/L (ref 1–40)
CHOLEST SERPL-MCNC: 134 MG/DL (ref 0–200)
HDLC SERPL-MCNC: 48 MG/DL (ref 40–60)
LDLC SERPL CALC-MCNC: 59 MG/DL (ref 0–100)
TRIGL SERPL-MCNC: 134 MG/DL (ref 0–150)
VLDLC SERPL CALC-MCNC: 26.8 MG/DL (ref 5–40)

## 2018-04-19 PROCEDURE — 99214 OFFICE O/P EST MOD 30 MIN: CPT | Performed by: INTERNAL MEDICINE

## 2018-04-19 RX ORDER — ATORVASTATIN CALCIUM 20 MG/1
20 TABLET, FILM COATED ORAL EVERY OTHER DAY
COMMUNITY
End: 2018-10-04 | Stop reason: SDUPTHER

## 2018-04-19 NOTE — PROGRESS NOTES
"    Alejandro Allison / 73 y.o. / male  04/19/2018      ASSESSMENT & PLAN:    Problem List Items Addressed This Visit        Unprioritized    Hyperlipidemia    Relevant Medications    atorvastatin (LIPITOR) 20 MG tablet    Other Relevant Orders    Lipid Panel    AST    ALT    Hypertension - Primary    Relevant Medications    lisinopril (PRINIVIL,ZESTRIL) 2.5 MG tablet    Restless legs syndrome    Relevant Medications    rOPINIRole (REQUIP) 0.25 MG tablet    Former smoker    Relevant Medications    VENTOLIN  (90 Base) MCG/ACT inhaler    Other Relevant Orders    CT Chest Low Dose Wo    Routine health maintenance      Other Visit Diagnoses    None.       Orders Placed This Encounter   Procedures   • CT Chest Low Dose Wo   • Lipid Panel   • AST   • ALT       Summary/Discussion:    Number-one hypertension stable on current medication.  Plan: Same meds, blood pressure check 4 months.    #2 hyperlipidemia on medication, status to be determined.  Plan: Continue same, check lipids and liver functions today when fasting.    #3 restless leg syndrome control, continue Requip.    #4 former smoker, schedule low-dose CT scan, last one done 2016.    #5 routine health maintenance, schedule annual wellness visit with nurse practitioner.      Return in about 4 months (around 8/19/2018), or AWV  with [facility]., for Blood pressure check.    ____________________________________________________________________    VITALS    Visit Vitals  /64   Pulse 66   Temp 97.9 °F (36.6 °C)   Ht 177.8 cm (70\")   Wt 99.5 kg (219 lb 6.4 oz)   SpO2 97%   BMI 31.48 kg/m²       BP Readings from Last 3 Encounters:   04/19/18 128/64   11/14/17 138/72   07/24/17 138/60     Wt Readings from Last 3 Encounters:   04/19/18 99.5 kg (219 lb 6.4 oz)   11/14/17 100 kg (220 lb 6.4 oz)   07/24/17 96.9 kg (213 lb 9.6 oz)      Body mass index is 31.48 kg/m².    CC:  Main reason(s) for today's visit: Hypertension      HPI:     She presents this morning for " follow-up of several medical issues.    Hypertension: He is compliant with medication, dietary salt restriction, exercise is limited.  Blood pressure at home is monitored and typically runs fairly close to normal range.    Hyperlipidemia: Patient is on Lipitor 20 mg a day and watching the fat in his diet he is fasting for lab work today.  His were done greater than a year ago.    Restless leg syndrome, patient is maintained on ropinirole and symptoms are resolved his satisfaction with use of medication.  In no medication side effects noted.    Patient former smoker, last low-dose CT scan was done in 2016,.  That will be repeated again this year.    Patient is also due for annual wellness visit be scheduled with our  nurse practitioner.      Patient Care Team:  Rosalio Lopes MD as PCP - General  ____________________________________________________________________    REVIEW OF SYSTEMS    Review of Systems   Respiratory: Negative for chest tightness and shortness of breath.    Cardiovascular: Negative for chest pain and palpitations.   Neurological: Negative for dizziness, syncope, speech difficulty, weakness, light-headedness and headaches.         PHYSICAL EXAMINATION    Physical Exam   Constitutional: He is oriented to person, place, and time. He appears well-developed. No distress.   obese   Cardiovascular: Normal rate, regular rhythm, normal heart sounds and intact distal pulses.  Exam reveals no gallop and no friction rub.    No murmur heard.  Pulmonary/Chest: Effort normal and breath sounds normal. He has no wheezes. He has no rales.   Musculoskeletal: He exhibits no edema.   Neurological: He is alert and oriented to person, place, and time.   Skin: Skin is warm and dry. No rash noted.   Psychiatric: He has a normal mood and affect. His behavior is normal. Judgment and thought content normal.   Nursing note and vitals reviewed.    REVIEWED DATA:    Labs:     Lab Results   Component Value Date      07/24/2017    K 4.6 07/24/2017    AST 30 07/24/2017    ALT 43 (H) 07/24/2017    BUN 16 07/24/2017    CREATININE 0.97 07/24/2017    CREATININE 0.85 03/23/2017    CREATININE 0.91 10/27/2016    EGFRIFNONA 76 07/24/2017    EGFRIFAFRI 92 07/24/2017       Lab Results   Component Value Date    GLUCOSE 123 (H) 08/12/2015    GLUCOSE 173 (H) 07/07/2014    GLUCOSE 90 04/15/2014       Lab Results   Component Value Date    LDL 82 03/23/2017    LDL 79 10/27/2016    LDL 80 08/12/2015    LDL 80 08/12/2015    HDL 68 (H) 03/23/2017    TRIG 105 03/23/2017       Lab Results   Component Value Date    TSH 3.050 07/24/2017       Lab Results   Component Value Date    WBC 4.88 03/23/2017    HGB 11.9 (L) 03/23/2017    HGB 12.5 (L) 09/16/2015    HGB 12.8 (L) 08/12/2015    HGB 12.8 (L) 08/12/2015     03/23/2017       Imaging:         Medical Tests:         Summary of old records / correspondence / consultant report:         Request outside records:         ALLERGIES  Allergies   Allergen Reactions   • Pravastatin    • Zocor  [Simvastatin]         MEDICATIONS  Current Outpatient Prescriptions   Medication Sig Dispense Refill   • atorvastatin (LIPITOR) 20 MG tablet Take 20 mg by mouth Every Other Day.     • aspirin 81 MG EC tablet Take by mouth.     • calcium carbonate-vitamin d 600-400 MG-UNIT per tablet Take 1 tablet by mouth 2 (two) times a day.     • lisinopril (PRINIVIL,ZESTRIL) 2.5 MG tablet TAKE ONE TABLET BY MOUTH EVERY DAY 90 tablet 1   • omeprazole (priLOSEC) 20 MG capsule TAKE ONE CAPSULE BY MOUTH ONCE DAILY 90 capsule 1   • rOPINIRole (REQUIP) 0.25 MG tablet TAKE ONE TABLET BY MOUTH EVERY DAY 90 tablet 0   • VENTOLIN  (90 Base) MCG/ACT inhaler INHALE 2 PUFFS INTO LUNGS FOUR TIMES A DAY AS DIRECTED USING SPACER 18 g 0     No current facility-administered medications for this visit.        PFSH:     The following portions of the patient's history were reviewed and updated as appropriate: Allergies / Current Medications /  Past Medical History / Surgical History / Social History / Family History    PROBLEM LIST   Patient Active Problem List   Diagnosis   • Anemia   • Erectile dysfunction of nonorganic origin   • Increased glucose level   • Gastroesophageal reflux disease   • Hyperlipidemia   • Hypertension   • Overweight   • Restless legs syndrome   • Encounter for immunization   • Former smoker   • Routine health maintenance       PAST MEDICAL HISTORY  Past Medical History:   Diagnosis Date   • Abnormal blood chemistry    • Anemia    • Diverticulosis 02/2014    Noted on colonoscopy February 2014   • Gilbert's disease    • History of deep vein thrombophlebitis of lower extremity    • Hyperlipidemia    • Hypertension    • Insomnia    • Overweight    • Preoperative evaluation to rule out surgical contraindication    • Skin ulcer    • Stroke        SURGICAL HISTORY  Past Surgical History:   Procedure Laterality Date   • ANKLE SURGERY Right    • COLONOSCOPY      Complete   • COLONOSCOPY N/A 9/22/2016    Procedure: COLONOSCOPY INTO CECUM;  Surgeon: Eze Oden MD;  Location: [facility] ENDOSCOPY;  Service:    • FRACTURE SURGERY     • ROTATOR CUFF REPAIR         SOCIAL HISTORY  Social History     Social History   • Marital status: Single   • Number of children: 2     Social History Main Topics   • Smoking status: Former Smoker     Years: 48.00     Types: Cigarettes     Quit date: 2004   • Smokeless tobacco: Never Used      Comment: He is agreeable to have low dose CT scan done.  Completed June 2016-negative   • Alcohol use Yes      Comment: social drinker   • Drug use: Unknown     Other Topics Concern   • Not on file       FAMILY HISTORY  Family History   Problem Relation Age of Onset   • Hypertension Mother    • Colon cancer Father    • Hypertension Brother    • Diabetes Other      mellitus   • Lung cancer Other          **Dragon Disclaimer:   Much of this encounter note is an electronic transcription/translation of spoken language to  printed text. The electronic translation of spoken language may permit erroneous, or at times, nonsensical words or phrases to be inadvertently transcribed. Although I have reviewed the note for such errors, some may still exist.

## 2018-05-10 DIAGNOSIS — K21.9 GASTROESOPHAGEAL REFLUX DISEASE WITHOUT ESOPHAGITIS: ICD-10-CM

## 2018-05-10 RX ORDER — OMEPRAZOLE 20 MG/1
CAPSULE, DELAYED RELEASE ORAL
Qty: 90 CAPSULE | Refills: 1 | Status: SHIPPED | OUTPATIENT
Start: 2018-05-10 | End: 2018-11-15 | Stop reason: SDUPTHER

## 2018-05-14 ENCOUNTER — APPOINTMENT (OUTPATIENT)
Dept: OTHER | Facility: HOSPITAL | Age: 73
End: 2018-05-14

## 2018-05-21 ENCOUNTER — CLINICAL SUPPORT (OUTPATIENT)
Dept: OTHER | Facility: HOSPITAL | Age: 73
End: 2018-05-21

## 2018-05-21 ENCOUNTER — HOSPITAL ENCOUNTER (OUTPATIENT)
Dept: PET IMAGING | Facility: HOSPITAL | Age: 73
Discharge: HOME | End: 2018-05-21
Admitting: NURSE PRACTITIONER

## 2018-05-21 VITALS — BODY MASS INDEX: 31.48 KG/M2 | WEIGHT: 219.9 LBS | HEIGHT: 70 IN

## 2018-05-21 DIAGNOSIS — Z12.2 ENCOUNTER FOR SCREENING FOR LUNG CANCER: ICD-10-CM

## 2018-05-21 DIAGNOSIS — Z87.891 PERSONAL HISTORY OF TOBACCO USE, PRESENTING HAZARDS TO HEALTH: Primary | ICD-10-CM

## 2018-05-21 DIAGNOSIS — Z87.891 PERSONAL HISTORY OF TOBACCO USE, PRESENTING HAZARDS TO HEALTH: ICD-10-CM

## 2018-05-21 PROCEDURE — G0296 VISIT TO DETERM LDCT ELIG: HCPCS | Performed by: NURSE PRACTITIONER

## 2018-05-21 PROCEDURE — G0297 LDCT FOR LUNG CA SCREEN: HCPCS

## 2018-05-21 NOTE — PROGRESS NOTES
"[facility]   LOW-DOSE LUNG CT SHARED DECISION MAKING VISIT    Alejandro Allison is a pleasant 73 y.o.   male seen today at the request of Rosalio Lopes MD in our Multidisciplinary Clinic. Here today for a shared decision making visit prior to Low-Dose CT Lung Cancer Screening.    HPI:   73 y.o. male who today reports a 52 pack year history.   his Cancer-related family history includes Colon cancer in his father; Lung cancer in his brother and other.    Patient does have a personal history of skin cancer.      SOCIAL HISTORY:  he  reports that he quit smoking about 8 years ago. His smoking use included Cigarettes. He started smoking about 62 years ago. He has a 52.00 pack-year smoking history. He has never used smokeless tobacco. He reports that he drinks about 4.2 - 8.4 oz of alcohol per week . He reports that he uses drugs, including Marijuana.    Patient Reports current second hand smoke exposure. Patient currently lives with his girlfriend who is an e-cigarette smoker.    Patient does not have a basement/current risk for radon exposure. Provided patient with written information for the [place] Radon Program and free testing process. Patient Reports prior asbestos exposure. 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      Ht 177.8 cm (70\")   Wt 99.7 kg (219 lb 14.4 oz)   BMI 31.55 kg/m²       DISCUSSION HELD TODAY:     Smoking cessation counseling: I advised the patient of the risks in continuing to use tobacco. In addition to cardiovascular risk there is an increased risk for cancers of the oral cavity and pharynx, larynx, lung, esophagus, pancreas, uterine, cervix, kidney, bladder, stomach, colon, rectum and liver as well as myeloid leukemia. We also discussed that this risk may also extend to development of female breast cancer and advanced stage prostate cancer. "     Patient verbalized they have remained tobacco free since quitting about 10 years ago cold turkey. Patient counseled on importance of continued abstinence remains committed to remaining a non-smoker.    Shared decision making for lung cancer screening: We discussed the NCCN guidelines for lung cancer screening and importance of annual screening. The patient verbalized understanding that annual screening is recommended for all persons between ages 55 and 80, with at least a 30 pack year smoking history until fifteen years beyond smoking. The Lung Cancer Screening Shared Decision-Making Tool was utilized for review. We discussed possible benefits of screening include reduced risk of dying from lung cancer, potential for increased success of treatment, early detection, and possibility of more treatment options if cancer is detected. Also discussed possible harms of screening including total radiation exposure, false positive result leading to invasive procedures or biopsies with their own risks for complications, as well as over diagnosis or identification of slow growing cancers that would not lead to illness or death. We also discussed the potential impact of co-morbidities and future ability or willingness to undergo diagnosis and treatment should they be needed.    Based on this discussion the patient has decided to proceed with a Low Dose Lung Cancer Screening CT today. The patient requests that the results of his screening be shared with his PCP as well.       The patient verbalizes understanding that we will call with results of this low dose lung CT exam and that assistance will be provided in arranging any necessary follow-up. After review of the NCCN guidelines and recommendations for ongoing screening, the patient verbalized understanding of recommendations for follow-up.     Orders Placed This Encounter   Procedures   • CT chest low dose wo     Standing Status:   Future     Number of Occurrences:   1      Standing Expiration Date:   5/21/2019     Scheduling Instructions:      Age 55-77 years. Patient age: 73; Patient is asymptomatic (no signs or symptoms of lung cancer): Yes ; Tobacco smoking history of at least 30 pack years. Patient pack year history: 52 ; If former smoker, number of years ago patient quit smoking (Must be < 15 years): 10

## 2018-05-25 ENCOUNTER — TELEPHONE (OUTPATIENT)
Dept: OTHER | Facility: HOSPITAL | Age: 73
End: 2018-05-25

## 2018-05-25 NOTE — TELEPHONE ENCOUNTER
Call placed to patient RE: low dose CT scans. Advised there are no new suspicious pulmonary opacities or nodules. Findings stable from previous year..  LungRADS Category 1. Annual screening low-dose chest CT is recommended in 12 months.    Patient verbalized understanding.

## 2018-06-07 RX ORDER — ATORVASTATIN CALCIUM 20 MG/1
TABLET, FILM COATED ORAL
Qty: 90 TABLET | Refills: 0 | Status: SHIPPED | OUTPATIENT
Start: 2018-06-07 | End: 2018-09-07 | Stop reason: SDUPTHER

## 2018-07-31 DIAGNOSIS — R06.02 SHORTNESS OF BREATH: ICD-10-CM

## 2018-07-31 DIAGNOSIS — Z87.891 FORMER SMOKER: ICD-10-CM

## 2018-07-31 RX ORDER — ALBUTEROL SULFATE 90 UG/1
AEROSOL, METERED RESPIRATORY (INHALATION)
Qty: 18 G | Refills: 0 | Status: SHIPPED | OUTPATIENT
Start: 2018-07-31

## 2018-07-31 RX ORDER — LISINOPRIL 2.5 MG/1
TABLET ORAL
Qty: 90 TABLET | Refills: 1 | Status: SHIPPED | OUTPATIENT
Start: 2018-07-31

## 2018-09-07 RX ORDER — ATORVASTATIN CALCIUM 20 MG/1
TABLET, FILM COATED ORAL
Qty: 90 TABLET | Refills: 0 | Status: SHIPPED | OUTPATIENT
Start: 2018-09-07 | End: 2018-10-04 | Stop reason: SINTOL

## 2018-10-04 ENCOUNTER — OFFICE VISIT (OUTPATIENT)
Dept: INTERNAL MEDICINE | Age: 73
End: 2018-10-04

## 2018-10-04 VITALS
DIASTOLIC BLOOD PRESSURE: 68 MMHG | TEMPERATURE: 97 F | WEIGHT: 208.2 LBS | HEIGHT: 70 IN | HEART RATE: 73 BPM | OXYGEN SATURATION: 99 % | BODY MASS INDEX: 29.81 KG/M2 | SYSTOLIC BLOOD PRESSURE: 135 MMHG

## 2018-10-04 DIAGNOSIS — R35.1 NOCTURIA: ICD-10-CM

## 2018-10-04 DIAGNOSIS — I10 ESSENTIAL HYPERTENSION: Primary | ICD-10-CM

## 2018-10-04 DIAGNOSIS — Z12.5 PROSTATE CANCER SCREENING: ICD-10-CM

## 2018-10-04 DIAGNOSIS — R25.2 MUSCLE CRAMPING: ICD-10-CM

## 2018-10-04 DIAGNOSIS — Z23 INFLUENZA VACCINE NEEDED: ICD-10-CM

## 2018-10-04 DIAGNOSIS — E11.9 TYPE 2 DIABETES MELLITUS WITHOUT COMPLICATION, WITHOUT LONG-TERM CURRENT USE OF INSULIN (HCC): Primary | ICD-10-CM

## 2018-10-04 LAB
ALBUMIN SERPL-MCNC: 4.6 G/DL (ref 3.5–5.2)
ALBUMIN/GLOB SERPL: 2 G/DL
ALP SERPL-CCNC: 75 U/L (ref 39–117)
ALT SERPL-CCNC: 48 U/L (ref 1–41)
AST SERPL-CCNC: 33 U/L (ref 1–40)
BILIRUB SERPL-MCNC: 2.2 MG/DL (ref 0.1–1.2)
BUN SERPL-MCNC: 15 MG/DL (ref 8–23)
BUN/CREAT SERPL: 17.4 (ref 7–25)
CALCIUM SERPL-MCNC: 9.6 MG/DL (ref 8.6–10.5)
CHLORIDE SERPL-SCNC: 98 MMOL/L (ref 98–107)
CO2 SERPL-SCNC: 25.1 MMOL/L (ref 22–29)
CREAT SERPL-MCNC: 0.86 MG/DL (ref 0.76–1.27)
DEVELOPER EXPIRATION DATE: NORMAL
DEVELOPER LOT NUMBER: NORMAL
EXPIRATION DATE: NORMAL
FECAL OCCULT BLOOD SCREEN, POC: NEGATIVE
GLOBULIN SER CALC-MCNC: 2.3 GM/DL
GLUCOSE SERPL-MCNC: 128 MG/DL (ref 65–99)
Lab: NORMAL
NEGATIVE CONTROL: NEGATIVE
POSITIVE CONTROL: POSITIVE
POTASSIUM SERPL-SCNC: 4.8 MMOL/L (ref 3.5–5.2)
PROT SERPL-MCNC: 6.9 G/DL (ref 6–8.5)
PSA SERPL-MCNC: 1.24 NG/ML (ref 0–4)
SODIUM SERPL-SCNC: 137 MMOL/L (ref 136–145)

## 2018-10-04 PROCEDURE — 90662 IIV NO PRSV INCREASED AG IM: CPT | Performed by: INTERNAL MEDICINE

## 2018-10-04 PROCEDURE — 82270 OCCULT BLOOD FECES: CPT | Performed by: INTERNAL MEDICINE

## 2018-10-04 PROCEDURE — 99214 OFFICE O/P EST MOD 30 MIN: CPT | Performed by: INTERNAL MEDICINE

## 2018-10-04 PROCEDURE — G0008 ADMIN INFLUENZA VIRUS VAC: HCPCS | Performed by: INTERNAL MEDICINE

## 2018-10-04 NOTE — PROGRESS NOTES
"[facility] [place] [facility]  MD Alejandro GRAYSON DEONTE Allison / 73 y.o. / male  10/04/2018      ASSESSMENT & PLAN:    Problem List Items Addressed This Visit        Unprioritized    Hypertension - Primary    Relevant Medications    lisinopril (PRINIVIL,ZESTRIL) 2.5 MG tablet    Other Relevant Orders    Comprehensive Metabolic Panel      Other Visit Diagnoses     Muscle cramping        Prostate cancer screening        Relevant Orders    PSA DIAGNOSTIC    Nocturia         Relevant Orders    PSA DIAGNOSTIC        Orders Placed This Encounter   Procedures   • Comprehensive Metabolic Panel   • PSA DIAGNOSTIC       Summary/Discussion:    Number-one hypertension stable on current meds, continue same, encouraged patient continue his weight loss efforts, blood pressure check 4 months, check CMP follow-up results by mail.    #2 muscle cramping I suspect this may be due to his atorvastatin recommend he discontinue medication and contact me by phone with an update on his symptoms in about 1 month.    #3 prostate cancer screening, we'll check PSA today and follow-up as above.        Return in about 4 months (around 2/4/2019) for Blood pressure check.    ____________________________________________________________________    VITALS    Visit Vitals  /68   Pulse 73   Temp 97 °F (36.1 °C)   Ht 177.8 cm (70\")   Wt 94.4 kg (208 lb 3.2 oz)   SpO2 99%   BMI 29.87 kg/m²       BP Readings from Last 3 Encounters:   10/04/18 135/68   04/19/18 128/64   11/14/17 138/72     Wt Readings from Last 3 Encounters:   10/04/18 94.4 kg (208 lb 3.2 oz)   05/21/18 99.7 kg (219 lb 14.4 oz)   04/19/18 99.5 kg (219 lb 6.4 oz)      Body mass index is 29.87 kg/m².    CC:  Main reason(s) for today's visit: Hypertension      HPI:     Patient presents this morning for follow-up of hypertension.  He is compliant with medication, dietary salt restriction, exercising some, and home blood pressure monitoring.  At home, blood pressure typically runs in the " 140/70 range.    Patient notes issues with muscle cramping both legs and hands.  He has had trouble with other statins in the past.  He is agreeable to discontinue the first at this time to see if that'll help resolve this problem.    Immunization update, patient received flu shot earlier this morning.  The office.    Laboratory review lipid April 2018 Normal, patient is fasting for lab work today.  Last PSA done 1.4 5 March 2017.    Patient Care Team:  Rosalio Lopes MD as PCP - General  ____________________________________________________________________    REVIEW OF SYSTEMS    Review of Systems   Respiratory: Negative for chest tightness and shortness of breath.    Cardiovascular: Negative for chest pain and palpitations.   Genitourinary: Positive for frequency.        Nocturia noted   Neurological: Negative for dizziness, syncope, speech difficulty, weakness, light-headedness and headaches.         PHYSICAL EXAMINATION    Physical Exam   Constitutional: He is oriented to person, place, and time. He appears well-developed. No distress.   o/w   Cardiovascular: Normal rate, regular rhythm, normal heart sounds and intact distal pulses.  Exam reveals no gallop and no friction rub.    No murmur heard.  Pulmonary/Chest: Effort normal and breath sounds normal. He has no wheezes. He has no rales.   Genitourinary: Rectum normal and prostate normal. Prostate is not enlarged and not tender.   Musculoskeletal: He exhibits no edema.   Neurological: He is alert and oriented to person, place, and time.   Skin: Skin is warm and dry. No rash noted.   Psychiatric: He has a normal mood and affect. His behavior is normal. Judgment and thought content normal.   Nursing note and vitals reviewed.        REVIEWED DATA:    Labs:     Lab Results   Component Value Date     07/24/2017    K 4.6 07/24/2017    AST 29 04/19/2018    ALT 40 04/19/2018    BUN 16 07/24/2017    CREATININE 0.97 07/24/2017    CREATININE 0.85 03/23/2017     CREATININE 0.91 10/27/2016    EGFRIFNONA 76 07/24/2017    EGFRIFAFRI 92 07/24/2017       Lab Results   Component Value Date    GLUCOSE 123 (H) 08/12/2015    GLUCOSE 173 (H) 07/07/2014    GLUCOSE 90 04/15/2014       Lab Results   Component Value Date    LDL 59 04/19/2018    LDL 82 03/23/2017    LDL 79 10/27/2016    HDL 48 04/19/2018    TRIG 134 04/19/2018       Lab Results   Component Value Date    TSH 3.050 07/24/2017       Lab Results   Component Value Date    WBC 4.88 03/23/2017    HGB 11.9 (L) 03/23/2017    HGB 12.5 (L) 09/16/2015    HGB 12.8 (L) 08/12/2015    HGB 12.8 (L) 08/12/2015     03/23/2017       Lab Results   Component Value Date    PSA 1.450 03/23/2017         Imaging:         Medical Tests:         Summary of old records / correspondence / consultant report:         Request outside records:         ALLERGIES  Allergies   Allergen Reactions   • Pravastatin    • Zocor  [Simvastatin]         MEDICATIONS  Current Outpatient Prescriptions   Medication Sig Dispense Refill   • aspirin 81 MG EC tablet Take by mouth.     • calcium carbonate-vitamin d 600-400 MG-UNIT per tablet Take 1 tablet by mouth 2 (two) times a day.     • lisinopril (PRINIVIL,ZESTRIL) 2.5 MG tablet TAKE ONE TABLET BY MOUTH ONCE DAILY 90 tablet 1   • omeprazole (priLOSEC) 20 MG capsule TAKE ONE CAPSULE BY MOUTH ONCE DAILY 90 capsule 1   • rOPINIRole (REQUIP) 0.25 MG tablet TAKE ONE TABLET BY MOUTH EVERY DAY 90 tablet 0   • VENTOLIN  (90 Base) MCG/ACT inhaler INHALE 2 PUFFS INTO LUNGS FOUR TIMES A DAY AS NEEDED 18 g 0     No current facility-administered medications for this visit.        PFSH:     The following portions of the patient's history were reviewed and updated as appropriate: Allergies / Current Medications / Past Medical History / Surgical History / Social History / Family History    PROBLEM LIST   Patient Active Problem List   Diagnosis   • Anemia   • Erectile dysfunction of nonorganic origin   • Increased glucose  level   • Gastroesophageal reflux disease   • Hyperlipidemia   • Hypertension   • Overweight   • Restless legs syndrome   • Encounter for immunization   • Former smoker   • Routine health maintenance       PAST MEDICAL HISTORY  Past Medical History:   Diagnosis Date   • Abnormal blood chemistry    • Anemia    • Diverticulosis 02/2014    Noted on colonoscopy February 2014   • Gilbert's disease    • History of deep vein thrombophlebitis of lower extremity    • Hyperlipidemia    • Hypertension    • Insomnia    • Overweight    • Preoperative evaluation to rule out surgical contraindication    • Skin ulcer (CMS/HCC)    • Stroke (CMS/HCC)        SURGICAL HISTORY  Past Surgical History:   Procedure Laterality Date   • ANKLE SURGERY Right    • COLONOSCOPY      Complete   • COLONOSCOPY N/A 9/22/2016    Procedure: COLONOSCOPY INTO CECUM;  Surgeon: Eze Oden MD;  Location: [facility] ENDOSCOPY;  Service:    • FRACTURE SURGERY     • ROTATOR CUFF REPAIR         SOCIAL HISTORY  Social History     Social History   • Marital status: Single   • Number of children: 2     Social History Main Topics   • Smoking status: Former Smoker     Packs/day: 1.00     Years: 52.00     Types: Cigarettes     Start date: 1956     Quit date: 2010   • Smokeless tobacco: Never Used      Comment: He is agreeable to have low dose CT scan done.  Completed June 2016-negative   • Alcohol use 4.2 - 8.4 oz/week     7 - 14 Cans of beer per week      Comment: Beer daily   • Drug use: Yes     Types: Marijuana      Comment: occasional marijuana   • Sexual activity: Defer     Other Topics Concern   • Not on file       FAMILY HISTORY  Family History   Problem Relation Age of Onset   • Hypertension Mother    • Colon cancer Father    • Hypertension Brother    • Lung cancer Brother    • Diabetes Other         mellitus   • Lung cancer Other        Health Maintenance Due   Topic   • ZOSTER VACCINE (2 of 3)   • AAA SCREEN (ONE-TIME)    • PNEUMOCOCCAL VACCINES (65+  LOW/MEDIUM RISK) (2 of 2 - PCV13)   • MEDICARE ANNUAL WELLNESS    • INFLUENZA VACCINE        Overdue health maintenance interventions  reviewed with patient.    Dictated utilizing Dragon voice recognition software

## 2018-11-15 DIAGNOSIS — K21.9 GASTROESOPHAGEAL REFLUX DISEASE WITHOUT ESOPHAGITIS: ICD-10-CM

## 2018-11-15 RX ORDER — OMEPRAZOLE 20 MG/1
CAPSULE, DELAYED RELEASE ORAL
Qty: 90 CAPSULE | Refills: 1 | Status: SHIPPED | OUTPATIENT
Start: 2018-11-15

## 2018-11-19 DIAGNOSIS — Z12.2 ENCOUNTER FOR SCREENING FOR LUNG CANCER: ICD-10-CM

## 2018-11-19 DIAGNOSIS — Z87.891 PERSONAL HISTORY OF NICOTINE DEPENDENCE: ICD-10-CM

## 2018-11-19 DIAGNOSIS — F17.200 SMOKER: Primary | ICD-10-CM

## 2018-12-03 NOTE — PATIENT INSTRUCTIONS
Medicare Wellness  Personal Prevention Plan of Service     Date of Office Visit:  2017  Encounter Provider:  Rosalio Lopes MD  Place of Service:  [facility]  Patient Name: Alejandro Allison  :  1945    As part of the Medicare Wellness portion of your visit today, we are providing you with this personalized preventive plan of services (PPPS). This plan is based upon recommendations of the [place] Preventive Services Task Force (USPSTF) and the Advisory Committee on Immunization Practices (ACIP).    This lists the preventive care services that should be considered, and provides dates of when you are due. Items listed as completed are up-to-date and do not require any further intervention.    Health Maintenance   Topic Date Due   • AAA SCREEN (ONE-TIME)  2016   • LIPID PANEL  10/27/2017   • MEDICARE ANNUAL WELLNESS  2018   • TDAP/TD VACCINES (2 - Td) 2026   • COLONOSCOPY  2026   • INFLUENZA VACCINE  Completed   • PNEUMOCOCCAL VACCINES (65+ LOW/MEDIUM RISK)  Completed   • ZOSTER VACCINE  Completed          Patient Self-Management and Personalized Health Advice  The patient has been provided with information about: exercise and weight management and preventive services including:   · Diabetes screening, see lab orders.    Visit Diagnoses:    ICD-10-CM ICD-9-CM   1. Medicare annual wellness visit, subsequent Z00.00 V70.0   2. Essential hypertension I10 401.9   3. Other hyperlipidemia E78.4 272.4   4. Gastroesophageal reflux disease without esophagitis K21.9 530.81   5. Restless legs syndrome G25.81 333.94       No orders of the defined types were placed in this encounter.      Outpatient Encounter Prescriptions as of 3/23/2017   Medication Sig Dispense Refill   • albuterol (PROVENTIL HFA;VENTOLIN HFA) 108 (90 BASE) MCG/ACT inhaler Inhale 2 puffs 4 (four) times a day. 1 inhaler 3   • aspirin 81 MG EC tablet Take by mouth.     • atorvastatin  (LIPITOR) 20 MG tablet TAKE ONE TABLET BY MOUTH DAILY AT BEDTIME 90 tablet 1   • calcium carbonate-vitamin d 600-400 MG-UNIT per tablet Take 1 tablet by mouth 2 (two) times a day.     • lisinopril (PRINIVIL,ZESTRIL) 2.5 MG tablet TAKE ONE TABLET BY MOUTH EVERY DAY 90 tablet 1   • omeprazole (PRILOSEC) 20 MG capsule Take 1 capsule by mouth daily. 90 capsule 1   • rOPINIRole (REQUIP) 0.25 MG tablet Take 1 tablet by mouth Every Night. Take 1 hour before bedtime. 30 tablet 3   • [DISCONTINUED] tadalafil (CIALIS) 20 MG tablet Take 1 tablet by mouth. 1 hour before activity as needed       No facility-administered encounter medications on file as of 3/23/2017.        Reviewed use of high risk medication in the elderly: yes  Reviewed for potential of harmful drug interactions in the elderly: yes    Follow Up:  1 year    An After Visit Summary and PPPS with all of these plans were given to the patient.            Helical Rim Advancement Flap Text: The defect edges were debeveled with a #15 blade scalpel.  Given the location of the defect and the proximity to free margins (helical rim) a double helical rim advancement flap was deemed most appropriate.  Using a sterile surgical marker, the appropriate advancement flaps were drawn incorporating the defect and placing the expected incisions between the helical rim and antihelix where possible.  The area thus outlined was incised through and through with a #15 scalpel blade.  With a skin hook and iris scissors, the flaps were gently and sharply undermined and freed up.

## 2018-12-07 RX ORDER — ATORVASTATIN CALCIUM 20 MG/1
TABLET, FILM COATED ORAL
Qty: 90 TABLET | Refills: 0 | Status: SHIPPED | OUTPATIENT
Start: 2018-12-07
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
